# Patient Record
Sex: MALE | Race: WHITE
[De-identification: names, ages, dates, MRNs, and addresses within clinical notes are randomized per-mention and may not be internally consistent; named-entity substitution may affect disease eponyms.]

---

## 2019-04-24 ENCOUNTER — HOSPITAL ENCOUNTER (OUTPATIENT)
Dept: HOSPITAL 59 - SUR | Age: 69
Discharge: HOME | End: 2019-04-24
Attending: ORTHOPAEDIC SURGERY
Payer: MEDICARE

## 2019-04-24 DIAGNOSIS — E78.00: ICD-10-CM

## 2019-04-24 DIAGNOSIS — E11.9: ICD-10-CM

## 2019-04-24 DIAGNOSIS — C61: ICD-10-CM

## 2019-04-24 DIAGNOSIS — M19.011: ICD-10-CM

## 2019-04-24 DIAGNOSIS — I10: ICD-10-CM

## 2019-04-24 DIAGNOSIS — S46.111A: Primary | ICD-10-CM

## 2019-04-24 DIAGNOSIS — M65.811: ICD-10-CM

## 2019-04-24 DIAGNOSIS — Z79.01: ICD-10-CM

## 2019-04-24 DIAGNOSIS — Z79.4: ICD-10-CM

## 2019-04-24 LAB
BASOPHILS NFR BLD: 0.6 % (ref 0–6)
EOSINOPHIL NFR BLD: 2.2 % (ref 0–6)
ERYTHROCYTE [DISTWIDTH] IN BLOOD BY AUTOMATED COUNT: 13.9 % (ref 11.5–14.5)
GRANULOCYTES NFR BLD: 58.6 % (ref 47–80)
HCT VFR BLD CALC: 48.4 % (ref 42–52)
HGB BLD-MCNC: 16.4 GM/DL (ref 14–18)
LYMPHOCYTES NFR BLD AUTO: 28 % (ref 16–45)
MCH RBC QN AUTO: 30.8 PG (ref 27–33)
MCHC RBC AUTO-ENTMCNC: 33.9 G/DL (ref 32–36)
MCV RBC AUTO: 91 FL (ref 81–97)
MONOCYTES NFR BLD: 10.6 % (ref 0–9)
PLATELET # BLD: 200 K/UL (ref 130–400)
PMV BLD AUTO: 10.2 FL (ref 7.4–10.4)
RBC # BLD AUTO: 5.32 M/UL (ref 4.4–5.7)
WBC # BLD AUTO: 6.9 K/UL (ref 4.2–12.2)

## 2019-04-24 PROCEDURE — 23415 RELEASE OF SHOULDER LIGAMENT: CPT

## 2019-04-24 PROCEDURE — 85025 COMPLETE CBC W/AUTO DIFF WBC: CPT

## 2019-04-24 PROCEDURE — 76942 ECHO GUIDE FOR BIOPSY: CPT

## 2019-04-24 PROCEDURE — 29822 SHO ARTHRS SRG LMTD DBRDMT: CPT

## 2019-04-24 PROCEDURE — 64415 NJX AA&/STRD BRCH PLXS IMG: CPT

## 2019-04-24 PROCEDURE — 29821 SHO ARTHRS SRG COMPL SYNVCT: CPT

## 2019-04-24 PROCEDURE — 36416 COLLJ CAPILLARY BLOOD SPEC: CPT

## 2019-04-24 PROCEDURE — 82948 REAGENT STRIP/BLOOD GLUCOSE: CPT

## 2019-04-24 PROCEDURE — 23125 CLAVICULECTOMY TOTAL: CPT

## 2019-04-24 PROCEDURE — 01630 ANES OPN/ARTHR PX SHO JT NOS: CPT

## 2019-04-25 NOTE — OPERATIVE NOTE
DATE OF SURGERY: 04/24/2019 



PREOPERATIVE DIAGNOSIS: Right shoulder impingement. 



POSTOPERATIVE DIAGNOSES: 

1. Ruptured long head of biceps tendon on the right. 

2. Right shoulder synovitis. 

3. Severe external impingement right shoulder. 

4. Advanced arthrosis right distal clavicle. 



OPERATION: 

1. Right shoulder arthroscopy with interarticular debridement with complete synovectomy. 

2. Right shoulder open acromioplasty, CA ligament resection, subacromial bursectomy. 

3. Right shoulder distal clavicle resection. 



STAFF SURGEON: Axel Bess MD 



ANESTHESIA: General. 



PREPARATION: Chloraprep. 



INDIVIDUAL CONSIDERATIONS: None. 



PROCEDURE: The patient was taken to the operating room, placed supine on the operating room table. 
He had a successful induction with general anesthetic. He was then placed in a semi-seated beach 
chair position. His right arm and shoulder were prepped and draped in the usual fashion. 



Examination under anesthesia showed normal motion and no instability. The patient had posterior 
portal identified for arthroscopy. Skin was infiltrated with 0.5% Marcaine with epinephrine prior. 
An 18-gauge spinal needle was placed in the joint, and the joint was inflated with normal saline 
with a 60-mL syringe. A stab wound was made, and a blunt-tipped trocar for the scope was placed in 
the joint. The joint was inflated with normal saline. The patient had diffuse synovitis and 
obviously ruptured long head. An anterior accessory portal was made in a retrograde fashion with a 
Wissinger marcelle, and the joint was irrigated out. A big stump which was fibrillated into the joint was 
then debrided of the long head using a shaver. The glenohumeral joint was otherwise normal. The 
remainder of the labrum was intact. There was diffuse synovitis and what appeared to be partial 
tears of the rotator cuff but the supraspinatus underneath was just smoothed with a shaver. 
Synovitis of the inferior pouch was debrided all out. After irrigation, portals were closed with 
staples. 



The patient had an anterior approach to the subacromial space and distal clavicle. Skin was again 
infiltrated with 0.5% Marcaine with epinephrine prior. Sharp dissection carried down through skin 
and subcutaneous tissues. Small veins were coagulated with a Bovie. An anterior deltoid interval was 
developed. Care was taken not to split the deltoid more than about 4 cm distal to the anterior tip 
of the acromion to prevent injury to the axillary nerve. Once in the subacromial space, he had a 
very large downsloping acromion with large spurs and large spurs at the acromioclavicular joint. The 
deltoid was then taken subperiosteally off the anterior aspect of the acromion, over the top of the 
intact CA ligament, and off the anterior aspect of a highly degenerated distal clavicle. CA ligament 
was resected with a Bovie. Distal clavicle was resected with an oscillating saw taking about 1 cm. 
An anterior acromioplasty was performed taking about a centimeter tapering towards posteromedially 
to include the spurs at the AC joint, most of this being spur. The undersurface was smoothed with a 
rasp. A complete bursectomy was performed. It was very thickened. Once this was done, I had a good 
look at the rotator cuff. Areas of it, especially the supraspinatus, showed it to be contused but 
there was no tear to repair.  



After irrigation, I placed the shoulder through a full range of motion to ensure no further 
impingement. The deltoid was reattached to remaining acromion with multiple interrupted #2 Vicryl 
going directly through the bony acromion. The periosteal cuff of the distal clavicle was closed with 
running #2 Vicryl. Anterior deltoid interval was closed with running #1 Vicryl. Subcu was closed 
with 2-0 plus Vicryl and skin was closed with staples. About 20 mL of 0.5% Marcaine with epinephrine 
along with 10 mg of morphine was injected into the subacromial space through a sterile 18-gauge 
needle. A sterile bulky compressive dressing and sling were applied. The patient tolerated the 
procedure well. Needle and sponge counts were correct. Estimated blood loss was minimal. He was 
taken back to recovery in good condition. There were no complications. 
EVELIN